# Patient Record
(demographics unavailable — no encounter records)

---

## 2025-01-06 NOTE — HISTORY OF PRESENT ILLNESS
[Gradual] : gradual [3] : 3 [Localized] : localized [Frequent] : frequent [Injection therapy] : injection therapy [de-identified] : 01/06/2025: 73 y/o male presents with chronic bilateral knee pain that has been worsening over the past year. No specific injury. Describes pain/stiffness that is worse is the morning. Denies buckling. He had significant relief with Orthovisc injections (completed 2017) up until recently. [] : no [FreeTextEntry1] : knees [FreeTextEntry5] : No recent injury, chronic knee pain; has been seen in the past  [de-identified] : 6 years ago [de-identified] : Dr. Deleon

## 2025-01-06 NOTE — PROCEDURE
[Large Joint Injection] : Large joint injection [Bilateral] : bilaterally of the [Knee] : knee [Pain] : pain [X-ray evidence of Osteoarthritis on this or prior visit] : x-ray evidence of Osteoarthritis on this or prior visit [Alcohol] : alcohol [Ethyl Chloride sprayed topically] : ethyl chloride sprayed topically [Sterile technique used] : sterile technique used [Orthovisc (30mg)] : 30mg of Orthovisc

## 2025-01-06 NOTE — ASSESSMENT
[FreeTextEntry1] : 01/06/2025: X-rays today, bilateral knee 4v - advance B/L knee OA, L>R Underlying pathology and treated options reviewed. Discussed CSI vs visco injection and patient elects to proceed with visco injections. Bilateral knee Orthovisc #1 injection tolerated well. Ice if sore. Return in 1 week to continue series.  Progress note completed by Jailyn Man PA-C under the direct supervision of Kiarn Deleon MD.

## 2025-01-06 NOTE — PHYSICAL EXAM
[Bilateral] : knee bilaterally [NL (0)] : extension 0 degrees [5___] : hamstring 5[unfilled]/5 [NL (140)] : flexion 140 degrees [] : no erythema

## 2025-01-13 NOTE — HISTORY OF PRESENT ILLNESS
[Gradual] : gradual [Localized] : localized [Frequent] : frequent [de-identified] : 1/13/25:   01/06/2025: 73 y/o male presents with chronic bilateral knee pain that has been worsening over the past year. No specific injury. Describes pain/stiffness that is worse is the morning. Denies buckling. He had significant relief with Orthovisc injections (completed 2017) up until recently. [5] : 5 [Rest] : rest [] : no [FreeTextEntry1] : knees [FreeTextEntry5] : No recent injury, chronic knee pain; has been seen in the past

## 2025-01-13 NOTE — PHYSICAL EXAM
[Bilateral] : knee bilaterally [NL (140)] : flexion 140 degrees [NL (0)] : extension 0 degrees [5___] : hamstring 5[unfilled]/5 [] : no erythema

## 2025-01-13 NOTE — ASSESSMENT
[FreeTextEntry1] : 01/06/2025: X-rays today, bilateral knee 4v - advance B/L knee OA, L>R Underlying pathology and treated options reviewed. Discussed CSI vs visco injection and patient elects to proceed with visco injections. Bilateral knee Orthovisc #1 injection tolerated well. Ice if sore. Return in 1 week to continue series.  1/13/25:

## 2025-01-27 NOTE — HISTORY OF PRESENT ILLNESS
[Gradual] : gradual [Localized] : localized [Frequent] : frequent [Rest] : rest [Orthovisc] : Orthovisc [3] : 3 [4] : 4 [Injection therapy] : injection therapy [de-identified] : 1/27/2025: Patient is here for a follow up for B/L knee pain.   01/06/2025: 73 y/o male presents with chronic bilateral knee pain that has been worsening over the past year. No specific injury. Describes pain/stiffness that is worse is the morning. Denies buckling. He had significant relief with Orthovisc injections (completed 2017) up until recently. [] : no [FreeTextEntry1] : knees [FreeTextEntry5] : No recent injury, chronic knee pain; has been seen in the past  [de-identified] : 1-13-25 [de-identified] : knees

## 2025-01-27 NOTE — ASSESSMENT
[FreeTextEntry1] : 01/06/2025: X-rays today, bilateral knee 4v - advance B/L knee OA, L>R Underlying pathology and treated options reviewed. Discussed CSI vs visco injection and patient elects to proceed with visco injections. Bilateral knee Orthovisc #1 injection tolerated well. Ice if sore. Return in 1 week to continue series.  1/13/25:   01/27/2025: we reviewed her course.  B/L knee Orthovisc #3 injection tolerated well.  Ice if sore.  Return in 1 week to continue series.   The documentation recorded by the scribe accurately reflects the service I personally performed and the decisions made by me. I, Sy Bagley, attest that this documentation has been prepared under the direction and in the presence of Provider Kiran Deleon MD.   The patient was seen by Kiran Deleon MD.

## 2025-02-03 NOTE — ASSESSMENT
[FreeTextEntry1] : 01/06/2025: X-rays today, bilateral knee 4v - advance B/L knee OA, L>R Underlying pathology and treated options reviewed. Discussed CSI vs visco injection and patient elects to proceed with visco injections. Bilateral knee Orthovisc #1 injection tolerated well. Ice if sore. Return in 1 week to continue series.  1/13/25: case discussed. Orthovisc #2 today. post injection instructions.  01/27/2025: we reviewed his course.  B/L knee Orthovisc #3 injection tolerated well.  Ice if sore.  Return in 1 week to continue series.   2/3/25: Case discussed. B/L knee Orthovisc #4 injection  tolerated well. Post injection instructions Ice as reviewed may repeat in 6 months if pain recurs  I, GAURAV Wilkins, attest that this documentation has been prepared under the direction and in the presence of Provider Kiran Deleon MD.   The patient was seen by GAURAV Wilkins

## 2025-02-03 NOTE — HISTORY OF PRESENT ILLNESS
[Gradual] : gradual [Localized] : localized [Frequent] : frequent [Rest] : rest [Injection therapy] : injection therapy [Orthovisc] : Orthovisc [4] : 4 [2] : 2 [de-identified] : 2/3/25: Here for follow up bilateral knees. L>R. No new issues. Orthovisc#4. 1/27/2025: Patient is here for a follow up for B/L knee pain. Orthovisc #3. 01/06/2025: 75 y/o male presents with chronic bilateral knee pain that has been worsening over the past year. No specific injury. Describes pain/stiffness that is worse is the morning. Denies buckling. He had significant relief with Orthovisc injections (completed 2017) up until recently. [] : no [FreeTextEntry1] : knees [FreeTextEntry5] : No recent injury, chronic knee pain; has been seen in the past  [de-identified] : 1-27-25 [de-identified] : knees

## 2025-07-14 NOTE — ASSESSMENT
[FreeTextEntry1] : 01/06/2025: X-rays today, bilateral knee 4v - advance B/L knee OA, L>R Underlying pathology and treated options reviewed. Discussed CSI vs visco injection and patient elects to proceed with visco injections. Bilateral knee Orthovisc #1 injection tolerated well. Ice if sore. Return in 1 week to continue series.  1/13/25: case discussed. Orthovisc #2 today. post injection instructions.  01/27/2025: we reviewed his course.  B/L knee Orthovisc #3 injection tolerated well.  Ice if sore.  Return in 1 week to continue series.   2/3/25: Case discussed. B/L knee Orthovisc #4 injection  tolerated well. Post injection instructions Ice as reviewed may repeat in 6 months if pain recurs  07/14/2025: We reviewed his course.  The gel injections provided relief on the RT knee but no relief on left knee.  We discussed risk and benefits of CSI. Patient elected to proceed with steroid injection today left knee- tolerated well. Ice if sore. Questions answered.  Follow up in 4-6 weeks if symptoms persist.   The documentation recorded by the scribe accurately reflects the service I personally performed and the decisions made by me. I, Sy Bagley, attest that this documentation has been prepared under the direction and in the presence of Provider Kiran Deleon MD.   The patient was seen by Kiran Deleon MD.

## 2025-07-14 NOTE — PROCEDURE
[FreeTextEntry3] : A Large joint injection was performed of the [LEFT KNEE]. The indication for this procedure was pain and inflammation, and patient had decreased mobility in the joint. Risks, benefits and alternatives to a steroid injection procedure were discussed; Risks outlined include but are not limited to infection, sepsis, bleeding, scarring, skin discoloration, temporary increase in pain, syncopal episode, failure to resolve symptoms, allergic reaction, symptom recurrence, and elevation of blood sugar in diabetics.. All questions were answered to the patient's apparent satisfaction and informed consent obtained. Prior to injection a 'Time Out' was conducted in accordance with [place] policy and the site and nature of procedure verified with the patient.    Procedure: The area of injection was prepared in a sterile fashion. The injection and aspiration was carried out utilizing sterile technique. The site was prepped with alcohol, betadine, ethyl chloride sprayed topically and sterile technique used.   ( X ) 1cc of Celestone(30mg/ml)  ( X ) 2cc of 1% Lidocaine   Patient tolerated the procedure well and direct pressure was applied for hemostasis. The patient was reminded of potential post-injection risks including, but not limited to, delayed hypersensitivity reactions and/or infection. Ice tonight to the injection site.

## 2025-07-14 NOTE — HISTORY OF PRESENT ILLNESS
[Gradual] : gradual [2] : 2 [Localized] : localized [Frequent] : frequent [Rest] : rest [Injection therapy] : injection therapy [4] : 4 [Orthovisc] : Orthovisc [de-identified] : 07/14/2025: Here for follow up, patient states he found relief from gels on the RT knee but no relief on the LT.  2/3/25: Here for follow up bilateral knees. L>R. No new issues. Orthovisc#4. 1/27/2025: Patient is here for a follow up for B/L knee pain. Orthovisc #3. 01/06/2025: 73 y/o male presents with chronic bilateral knee pain that has been worsening over the past year. No specific injury. Describes pain/stiffness that is worse is the morning. Denies buckling. He had significant relief with Orthovisc injections (completed 2017) up until recently. [] : no [FreeTextEntry1] : LT knee [FreeTextEntry5] : No recent injury, chronic knee pain; has been seen in the past  [de-identified] : 1-27-25 [de-identified] : knees

## 2025-07-14 NOTE — DISCUSSION/SUMMARY
[de-identified] : The patient was advised of the diagnosis. The natural history of the pathology was explained in full to the patient in layman's terms. The risks and benefits of surgical and non-surgical treatment alternatives were explained in full to the patient. All questions were answered.